# Patient Record
Sex: FEMALE | Race: WHITE | Employment: FULL TIME | URBAN - METROPOLITAN AREA
[De-identification: names, ages, dates, MRNs, and addresses within clinical notes are randomized per-mention and may not be internally consistent; named-entity substitution may affect disease eponyms.]

---

## 2023-11-27 NOTE — PROGRESS NOTES
HPI    Jessica Zayas is a 38 y.o. female who presents for evaluation of bleeding after having a bowel movement.     Colonoscopy:    Non-smoker/No ETOH/No Illicit drug use  No family history of CRC or IBD  Employment:     Past Medical History:   Diagnosis Date    Other specified health status 01/07/2020    Known health problems: none       No past surgical history on file.    Not on File    Review of Systems    Physical Exam    Assessment and Plan:

## 2023-12-06 ENCOUNTER — APPOINTMENT (OUTPATIENT)
Dept: SURGERY | Facility: CLINIC | Age: 38
End: 2023-12-06
Payer: COMMERCIAL

## 2024-01-15 NOTE — PROGRESS NOTES
HPI    Jessica Zayas is a 38 y.o. female who presents for evaluation of rectal bleeding. She was given a course of hydrocortisone cream by her PCP. Bleeding started 11/2023. Bleeding was bright red with BM's in the toilet and on the stool. The hydrocortisone worked for a few weeks but she had 1-2 bleeding episodes after using the cream. No pain but describes it as discomfort. Some itching that stopped after using the cream. No excessive moisture. No abdominal pain. No N/V. She can go a few days without a BM but then will have 2-3 BM's in a day. Some straining. Minimal toilet sitting time.     Colonoscopy 2016 for abdominal pain, non bleeding internal hemorrhoids were found per recent [facility] note    Non-smoker/Social ETOH- once per week/No Illicit drug use  PMH: None   PSH: LEEP procedure   NKDA  Paternal grandfather passed from colon cancer. No family history of IBD.   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    Not on File    Current Outpatient Medications   Medication Sig Dispense Refill    prenatal vit 93/iron fum/folic (PRENATAL FORMULA ORAL) Take by mouth.       No current facility-administered medications for this visit.     No Known Allergies      Review of Systems   Constitutional:  Negative for activity change, appetite change, chills, diaphoresis, fatigue, fever and unexpected weight change.   Respiratory:  Negative for cough, chest tightness and shortness of breath.    Gastrointestinal:  Positive for anal bleeding and blood in stool. Negative for abdominal pain, diarrhea, nausea, rectal pain and vomiting.   Genitourinary:  Negative for difficulty urinating, dysuria and hematuria.   All other systems reviewed and are negative.      Physical Exam  Vitals reviewed. Exam conducted with a chaperone present.   Constitutional:       Appearance:  Normal appearance.   HENT:      Head: Normocephalic.   Eyes:      Pupils: Pupils are equal, round, and reactive to light.   Cardiovascular:      Rate and Rhythm: Normal rate and regular rhythm.      Pulses: Normal pulses.      Heart sounds: Normal heart sounds.   Pulmonary:      Effort: Pulmonary effort is normal.      Breath sounds: Normal breath sounds.   Abdominal:      General: There is no distension.      Palpations: Abdomen is soft. There is no mass.      Tenderness: There is no abdominal tenderness.      Hernia: No hernia is present.   Musculoskeletal:         General: Normal range of motion.      Cervical back: Normal range of motion.   Skin:     General: Skin is warm and dry.      Capillary Refill: Capillary refill takes less than 2 seconds.   Neurological:      General: No focal deficit present.      Mental Status: She is alert and oriented to person, place, and time. Mental status is at baseline.   Psychiatric:         Mood and Affect: Mood normal.         Behavior: Behavior normal.         Thought Content: Thought content normal.         Judgment: Judgment normal.     Perianal exam:  Externally no abnormalities.  No fissure.    LAZ:  Soft stool within rectal vault.  No palpable masses or blood on exam.  Anoscopy:  Normal appearing anorectal mucosa without evidence of proctitis, very small internal hemorrhoid in the right anterior position.  Otherwise no visualized masses or lesions.    Assessment and Plan:   #Blood per rectum  -  No obvious abnormalities on exam today; no external hemorrhoids or fissure, tiny internal hemorrhoids  -  Referral for colonoscopy

## 2024-01-17 ENCOUNTER — OFFICE VISIT (OUTPATIENT)
Dept: SURGERY | Facility: CLINIC | Age: 39
End: 2024-01-17
Payer: COMMERCIAL

## 2024-01-17 VITALS
HEART RATE: 105 BPM | SYSTOLIC BLOOD PRESSURE: 119 MMHG | BODY MASS INDEX: 22.98 KG/M2 | HEIGHT: 66 IN | WEIGHT: 143 LBS | TEMPERATURE: 98.3 F | DIASTOLIC BLOOD PRESSURE: 85 MMHG

## 2024-01-17 DIAGNOSIS — K62.5 RECTAL BLEEDING: ICD-10-CM

## 2024-01-17 PROCEDURE — 46600 DIAGNOSTIC ANOSCOPY SPX: CPT | Performed by: STUDENT IN AN ORGANIZED HEALTH CARE EDUCATION/TRAINING PROGRAM

## 2024-01-17 PROCEDURE — 99213 OFFICE O/P EST LOW 20 MIN: CPT | Performed by: STUDENT IN AN ORGANIZED HEALTH CARE EDUCATION/TRAINING PROGRAM

## 2024-01-17 PROCEDURE — 99203 OFFICE O/P NEW LOW 30 MIN: CPT | Performed by: STUDENT IN AN ORGANIZED HEALTH CARE EDUCATION/TRAINING PROGRAM

## 2024-01-17 ASSESSMENT — ENCOUNTER SYMPTOMS
BLOOD IN STOOL: 1
ANAL BLEEDING: 1
FEVER: 0
FATIGUE: 0
UNEXPECTED WEIGHT CHANGE: 0
APPETITE CHANGE: 0
HEMATURIA: 0
CHEST TIGHTNESS: 0
ACTIVITY CHANGE: 0
VOMITING: 0
SHORTNESS OF BREATH: 0
DYSURIA: 0
CHILLS: 0
NAUSEA: 0
DIARRHEA: 0
DIFFICULTY URINATING: 0
COUGH: 0
RECTAL PAIN: 0
ABDOMINAL PAIN: 0
DIAPHORESIS: 0

## 2024-01-23 DIAGNOSIS — Z12.11 COLON CANCER SCREENING: ICD-10-CM

## 2024-01-23 RX ORDER — SODIUM, POTASSIUM,MAG SULFATES 17.5-3.13G
1 SOLUTION, RECONSTITUTED, ORAL ORAL EVERY 12 HOURS
Qty: 2 EACH | Refills: 0 | Status: SHIPPED | OUTPATIENT
Start: 2024-01-23

## 2024-01-31 ENCOUNTER — APPOINTMENT (OUTPATIENT)
Dept: SURGERY | Facility: CLINIC | Age: 39
End: 2024-01-31
Payer: COMMERCIAL

## 2024-02-28 ENCOUNTER — APPOINTMENT (OUTPATIENT)
Dept: GASTROENTEROLOGY | Facility: HOSPITAL | Age: 39
End: 2024-02-28
Payer: COMMERCIAL